# Patient Record
Sex: FEMALE | Race: AMERICAN INDIAN OR ALASKA NATIVE
[De-identification: names, ages, dates, MRNs, and addresses within clinical notes are randomized per-mention and may not be internally consistent; named-entity substitution may affect disease eponyms.]

---

## 2020-10-04 ENCOUNTER — HOSPITAL ENCOUNTER (EMERGENCY)
Dept: HOSPITAL 5 - ED | Age: 60
Discharge: HOME | End: 2020-10-04
Payer: SELF-PAY

## 2020-10-04 VITALS — DIASTOLIC BLOOD PRESSURE: 78 MMHG | SYSTOLIC BLOOD PRESSURE: 138 MMHG

## 2020-10-04 DIAGNOSIS — M54.9: ICD-10-CM

## 2020-10-04 DIAGNOSIS — Z79.899: ICD-10-CM

## 2020-10-04 DIAGNOSIS — M25.512: Primary | ICD-10-CM

## 2020-10-04 DIAGNOSIS — F12.10: ICD-10-CM

## 2020-10-04 DIAGNOSIS — M19.90: ICD-10-CM

## 2020-10-04 LAB
BILIRUB UR QL STRIP: (no result)
BLOOD UR QL VISUAL: (no result)
MUCOUS THREADS #/AREA URNS HPF: (no result) /HPF
PH UR STRIP: 5 [PH] (ref 5–7)
PROT UR STRIP-MCNC: (no result) MG/DL
RBC #/AREA URNS HPF: 1 /HPF (ref 0–6)
UROBILINOGEN UR-MCNC: 2 MG/DL (ref ?–2)
WBC #/AREA URNS HPF: 1 /HPF (ref 0–6)

## 2020-10-04 PROCEDURE — 99283 EMERGENCY DEPT VISIT LOW MDM: CPT

## 2020-10-04 PROCEDURE — 81001 URINALYSIS AUTO W/SCOPE: CPT

## 2020-10-04 NOTE — EMERGENCY DEPARTMENT REPORT
ED General Adult HPI





- General


Chief complaint: Back Pain/Injury


Stated complaint: BACK AND SHOULDER PAIN


Time Seen by Provider: 10/04/20 14:01


Source: patient


Mode of arrival: Ambulatory


Limitations: No Limitations





- Related Data


                                  Previous Rx's











 Medication  Instructions  Recorded  Last Taken  Type


 


Amoxicillin [Trimox CAP] 500 mg PO Q8H #30 capsule 03/18/14 Unknown Rx


 


Antipyrine/Benzocaine/Glycerin 2 drops AU Q8HR #1 bottle 03/18/14 Unknown Rx





[Auralgan]    


 


Ciprofloxacin/Hydrocortisone 4 drop OT BID #1 bottle 03/18/14 Unknown Rx





[Cipro HC Otic 0.2%-1%]    


 


Meloxicam [Mobic] 7.5 mg PO QDAY #30 tablet 10/04/20 Unknown Rx


 


Trolamine Salicylate/Aloe Vera 85 gm TP TID PRN #141.7 cream..g. 10/04/20 

Unknown Rx





[Aspercreme 10% Cream]    











                                    Allergies











Allergy/AdvReac Type Severity Reaction Status Date / Time


 


No Known Allergies Allergy   Verified 10/04/20 12:09














ED Review of Systems


ROS: 


Stated complaint: BACK AND SHOULDER PAIN


Other details as noted in HPI








ED Past Medical Hx





- Past Medical History


Hx Arthritis: Yes





- Surgical History


Past Surgical History?: No





- Social History


Smoking Status: Never Smoker


Substance Use Type: Alcohol, Marijuana





- Medications


Home Medications: 


                                Home Medications











 Medication  Instructions  Recorded  Confirmed  Last Taken  Type


 


Amoxicillin [Trimox CAP] 500 mg PO Q8H #30 capsule 03/18/14  Unknown Rx


 


Antipyrine/Benzocaine/Glycerin 2 drops AU Q8HR #1 bottle 03/18/14  Unknown Rx





[Auralgan]     


 


Ciprofloxacin/Hydrocortisone 4 drop OT BID #1 bottle 03/18/14  Unknown Rx





[Cipro HC Otic 0.2%-1%]     


 


Meloxicam [Mobic] 7.5 mg PO QDAY #30 tablet 10/04/20  Unknown Rx


 


Trolamine Salicylate/Aloe Vera 85 gm TP TID PRN #141.7 cream..g. 10/04/20  

Unknown Rx





[Aspercreme 10% Cream]     














ED Physical Exam





- General


Limitations: No Limitations


General appearance: alert, in no apparent distress





- Head


Head exam: Present: atraumatic, normocephalic





- Eye


Eye exam: Present: normal appearance





- ENT


ENT exam: Present: mucous membranes moist





- Neck


Neck exam: Present: normal inspection





- Respiratory


Respiratory exam: Present: normal lung sounds bilaterally.  Absent: respiratory 

distress





- Cardiovascular


Cardiovascular Exam: Present: regular rate, normal rhythm.  Absent: systolic 

murmur, diastolic murmur, rubs, gallop





- GI/Abdominal


GI/Abdominal exam: Present: soft, normal bowel sounds





- Extremities Exam


Extremities exam: Present: normal inspection





- Expanded Upper Extremity Exam


  ** Left


Shoulder Exam: Present: full ROM, tenderness over AC joint.  Absent: swelling, 

abrasion, laceration


Upper Arm exam: Present: normal inspection, full ROM


Elbow exam: Present: normal inspection, full ROM


Forearm Wrist exam: Present: normal inspection, full ROM


Hand Wrist exam: Present: normal inspection, full ROM





- Back Exam


Back exam: Present: normal inspection





- Neurological Exam


Neurological exam: Present: alert, oriented X3





- Psychiatric


Psychiatric exam: Present: normal affect, normal mood





- Skin


Skin exam: Present: warm, dry, intact, normal color.  Absent: rash





ED Course





                                   Vital Signs











  10/04/20





  12:14


 


Temperature 98.1 F


 


Pulse Rate 71


 


Respiratory 18





Rate 


 


Blood Pressure 160/89


 


O2 Sat by Pulse 100





Oximetry 











Critical care attestation.: 


If time is entered above; I have spent that time in minutes in the direct care 

of this critically ill patient, excluding procedure time.








ED Disposition


Clinical Impression: 


Shoulder pain, left


Qualifiers:


 Chronicity: acute Qualified Code(s): M25.512 - Pain in left shoulder





Left lumbar pain


Qualifiers:


 Chronicity: acute Sciatica presence: with sciatica 





Disposition: DC-01 TO HOME OR SELFCARE


Is pt being admited?: No


Does the pt Need Aspirin: No


Condition: Stable


Instructions:  Osteoarthritis (ED), Meloxicam (By mouth)


Additional Instructions: 


Recommend taking Ibuprofen or Tylenol arthritis and try aspercream rub. 


Prescriptions: 


Trolamine Salicylate/Aloe Vera [Aspercreme 10% Cream] 85 gm TP TID PRN #141.7 

cream..g.


 PRN Reason: Pain, Moderate (4-6)


Meloxicam [Mobic] 7.5 mg PO QDAY #30 tablet


Referrals: 


PR[facility] CARE,MD [Primary Care Provider] - 3-5 Days


[facility] [Provider Group] - 3-5 Days


Forms:  Work/School Release Form(ED)

## 2020-10-22 ENCOUNTER — HOSPITAL ENCOUNTER (EMERGENCY)
Dept: HOSPITAL 5 - ED | Age: 60
Discharge: HOME | End: 2020-10-22
Payer: SELF-PAY

## 2020-10-22 VITALS — SYSTOLIC BLOOD PRESSURE: 149 MMHG | DIASTOLIC BLOOD PRESSURE: 89 MMHG

## 2020-10-22 DIAGNOSIS — H10.33: Primary | ICD-10-CM

## 2020-10-22 DIAGNOSIS — Z79.899: ICD-10-CM

## 2020-10-22 DIAGNOSIS — F12.10: ICD-10-CM

## 2020-10-22 DIAGNOSIS — M13.88: ICD-10-CM

## 2020-10-22 PROCEDURE — 99281 EMR DPT VST MAYX REQ PHY/QHP: CPT

## 2020-10-22 NOTE — EMERGENCY DEPARTMENT REPORT
ED Eye Problem HPI





- General


Chief complaint: Eye Problems


Stated complaint: POSS PINK EYE


Time Seen by Provider: 10/22/20 19:55


Source: patient


Mode of arrival: Ambulatory


Limitations: No Limitations





- History of Present Illness


Initial comments: 





Patient is a 60-year-old female presents emergency room with complaints of 

bilateral eye irritation that began a week ago.  She states that initially began

in the left eye but then she also began experiencing in the right eye.  She 

states that she has crusting, eyelash matting, frequent watering, itching eyes. 

She states that she has been taking over-the-counter pinkeye relief drops with 

some relief.  She denies getting anything into the eye.  She denies any contact 

lens use.  She denies any use of false lashes or glue.  She denies any vision 

changes.  Past medical history of hypertension.  No allergies medications.





- Related Data


                                  Previous Rx's











 Medication  Instructions  Recorded  Last Taken  Type


 


Amoxicillin [Trimox CAP] 500 mg PO Q8H #30 capsule 03/18/14 Unknown Rx


 


Antipyrine/Benzocaine/Glycerin 2 drops AU Q8HR #1 bottle 03/18/14 Unknown Rx





[Auralgan]    


 


Ciprofloxacin/Hydrocortisone 4 drop OT BID #1 bottle 03/18/14 Unknown Rx





[Cipro HC Otic 0.2%-1%]    


 


Meloxicam [Mobic] 7.5 mg PO QDAY #30 tablet 10/04/20 Unknown Rx


 


Trolamine Salicylate/Aloe Vera 85 gm TP TID PRN #141.7 cream..g. 10/04/20 

Unknown Rx





[Aspercreme 10% Cream]    


 


Ketotifen Fumarate [Allergy Eye 1 drop OP BID 7 Days #1 drops 10/22/20 Unknown 

Rx





Drops]    


 


Polymyxin B Sulf/Trimethoprim 1 drop OP Q3HR 7 Days #1 drops 10/22/20 Unknown Rx





[Polytrim Eye Drops]    











                                    Allergies











Allergy/AdvReac Type Severity Reaction Status Date / Time


 


No Known Allergies Allergy   Verified 10/04/20 12:09














ED Review of Systems


ROS: 


Stated complaint: POSS PINK EYE


Other details as noted in HPI





Comment: All other systems reviewed and negative





ED Past Medical Hx





- Past Medical History


Previous Medical History?: Yes


Hx Arthritis: Yes





- Social History


Smoking Status: Never Smoker


Substance Use Type: Alcohol, Marijuana





- Medications


Home Medications: 


                                Home Medications











 Medication  Instructions  Recorded  Confirmed  Last Taken  Type


 


Amoxicillin [Trimox CAP] 500 mg PO Q8H #30 capsule 03/18/14  Unknown Rx


 


Antipyrine/Benzocaine/Glycerin 2 drops AU Q8HR #1 bottle 03/18/14  Unknown Rx





[Auralgan]     


 


Ciprofloxacin/Hydrocortisone 4 drop OT BID #1 bottle 03/18/14  Unknown Rx





[Cipro HC Otic 0.2%-1%]     


 


Meloxicam [Mobic] 7.5 mg PO QDAY #30 tablet 10/04/20  Unknown Rx


 


Trolamine Salicylate/Aloe Vera 85 gm TP TID PRN #141.7 cream..g. 10/04/20  

Unknown Rx





[Aspercreme 10% Cream]     


 


Ketotifen Fumarate [Allergy Eye 1 drop OP BID 7 Days #1 drops 10/22/20  Unknown 

Rx





Drops]     


 


Polymyxin B Sulf/Trimethoprim 1 drop OP Q3HR 7 Days #1 drops 10/22/20  Unknown 

Rx





[Polytrim Eye Drops]     














ED Physical Exam





- General


Limitations: No Limitations


General appearance: alert, in no apparent distress





- Head


Head exam: Present: atraumatic, normocephalic





- Eye


Eye exam: Present: PERRL, EOMI, conjunctival injection (mild bilaterally).  

Absent: periorbital swelling, periorbital tenderness


Pupils: Present: normal accommodation





- ENT


ENT exam: Present: mucous membranes moist





- Respiratory


Respiratory exam: Absent: respiratory distress, accessory muscle use





- Neurological Exam


Neurological exam: Present: alert, oriented X3





- Psychiatric


Psychiatric exam: Present: normal affect, normal mood





- Skin


Skin exam: Present: warm, dry, intact





ED Course


                                   Vital Signs











  10/22/20





  17:31


 


Temperature 98.5 F


 


Pulse Rate 92 H


 


Respiratory 16





Rate 


 


Blood Pressure 149/89





[Right] 


 


O2 Sat by Pulse 98





Oximetry 














ED Medical Decision Making





- Medical Decision Making





Patient is a 60-year-old female presents emergency room with complaints of 

bilateral eye irritation that began a week ago.  She states that initially began

 in the left eye but then she also began experiencing in the right eye.  She 

states that she has crusting, eyelash matting, frequent watering, itching eyes. 

 She states that she has been taking over-the-counter pinkeye relief drops with 

some relief.  She denies getting anything into the eye.  She denies any contact 

lens use.  She denies any use of false lashes or glue.  She denies any vision 

changes.  Past medical history of hypertension.  No allergies medications. 

vitals are stable. on exam: Mild conjunctival injection bilaterally, PERRLA, 

EOMI, no periorbital edema or erythema, no visualized foreign bodies or 

ulcerations.  Examination consistent with very mild conjunctivitis.  Patient 

given prescription for Zaditor and Polytrim eyedrops.  Advised patient Please 

use medication as prescribed.  Separate eyedrops by one hour.  Follow-up with a 

primary care doctor.  Follow-up with ophthalmologist if symptoms are not 

improving.  Return to emergency room for any new or worsening symptoms.


Critical care attestation.: 


If time is entered above; I have spent that time in minutes in the direct care 

of this critically ill patient, excluding procedure time.








ED Disposition


Clinical Impression: 


Conjunctivitis


Qualifiers:


 Conjunctivitis type: acute Acute conjunctivitis type: unspecified Laterality: 

bilateral Qualified Code(s): H10.33 - Unspecified acute conjunctivitis, 

bilateral





Disposition: DC-01 TO HOME OR SELFCARE


Is pt being admited?: No


Does the pt Need Aspirin: No


Condition: Stable


Instructions:  Conjunctivitis (ED)


Additional Instructions: 


Please use medication as prescribed.  Separate eyedrops by one hour.  Follow-up 

with a primary care doctor.  Follow-up with ophthalmologist if symptoms are not 

improving.  Return to emergency room for any new or worsening symptoms.


Prescriptions: 


Ketotifen Fumarate [Allergy Eye Drops] 1 drop OP BID 7 Days #1 drops


Polymyxin B Sulf/Trimethoprim [Polytrim Eye Drops] 1 drop OP Q3HR 7 Days #1 

drops


Referrals: 


ITALIA LUND MD [Staff Physician] - 2-3 Days


[facility] [Provider Group] - 2-3 Days


[facility] [Provider Group] - 2-3 Days


Forms:  Work/School Release Form(ED)


Time of Disposition: 19:57


Print Language: ENGLISH